# Patient Record
Sex: MALE | URBAN - METROPOLITAN AREA
[De-identification: names, ages, dates, MRNs, and addresses within clinical notes are randomized per-mention and may not be internally consistent; named-entity substitution may affect disease eponyms.]

---

## 2019-04-11 ENCOUNTER — OFFICE VISIT (OUTPATIENT)
Dept: URBAN - METROPOLITAN AREA CLINIC 18 | Facility: CLINIC | Age: 69
End: 2019-04-11
Payer: COMMERCIAL

## 2019-04-11 DIAGNOSIS — H52.03 HYPERMETROPIA, BILATERAL: Primary | ICD-10-CM

## 2019-04-11 DIAGNOSIS — H53.2 DIPLOPIA: ICD-10-CM

## 2019-04-11 PROCEDURE — 92012 INTRM OPH EXAM EST PATIENT: CPT | Performed by: OPTOMETRIST

## 2019-04-11 PROCEDURE — 92015 DETERMINE REFRACTIVE STATE: CPT | Performed by: OPTOMETRIST

## 2019-04-11 ASSESSMENT — INTRAOCULAR PRESSURE
OS: 13
OD: 14

## 2019-04-11 ASSESSMENT — VISUAL ACUITY
OS: 20/20
OD: 20/20

## 2019-04-11 ASSESSMENT — KERATOMETRY
OS: 44.75
OD: 43.38

## 2019-04-11 NOTE — IMPRESSION/PLAN
Impression: Diplopia: H53.2. Plan: Discussed diagnosis in detail with patient. Discussed treatment options with patient. Will continue to observe condition and or symptoms.